# Patient Record
Sex: FEMALE | Race: WHITE | Employment: UNEMPLOYED | URBAN - METROPOLITAN AREA
[De-identification: names, ages, dates, MRNs, and addresses within clinical notes are randomized per-mention and may not be internally consistent; named-entity substitution may affect disease eponyms.]

---

## 2017-03-29 RX ORDER — BUPROPION HYDROCHLORIDE 300 MG/1
300 TABLET ORAL DAILY
Qty: 90 TABLET | Refills: 0 | Status: SHIPPED | OUTPATIENT
Start: 2017-03-29 | End: 2019-01-04 | Stop reason: ALTCHOICE

## 2017-03-29 NOTE — TELEPHONE ENCOUNTER
Approve/ deny Wellbutrin  mg?    #90     Last OV with Kitty See was 09/12/16    Medication last authorized 12/20/16   #90

## 2017-03-29 NOTE — TELEPHONE ENCOUNTER
Pt called pharmacy and they told her to contact PCP because she has no more refills on her wellbutrin.

## 2017-04-18 ENCOUNTER — OFFICE VISIT (OUTPATIENT)
Dept: OBGYN CLINIC | Facility: CLINIC | Age: 42
End: 2017-04-18

## 2017-04-18 VITALS
DIASTOLIC BLOOD PRESSURE: 84 MMHG | HEART RATE: 88 BPM | RESPIRATION RATE: 16 BRPM | WEIGHT: 148 LBS | HEIGHT: 64 IN | BODY MASS INDEX: 25.27 KG/M2 | SYSTOLIC BLOOD PRESSURE: 134 MMHG

## 2017-04-18 DIAGNOSIS — Z01.419 ENCOUNTER FOR WELL WOMAN EXAM WITH ROUTINE GYNECOLOGICAL EXAM: Primary | ICD-10-CM

## 2017-04-18 PROCEDURE — 88175 CYTOPATH C/V AUTO FLUID REDO: CPT | Performed by: OBSTETRICS & GYNECOLOGY

## 2017-04-18 PROCEDURE — 87624 HPV HI-RISK TYP POOLED RSLT: CPT | Performed by: OBSTETRICS & GYNECOLOGY

## 2017-04-18 PROCEDURE — 99396 PREV VISIT EST AGE 40-64: CPT | Performed by: OBSTETRICS & GYNECOLOGY

## 2017-04-18 RX ORDER — FLUOXETINE 20 MG/1
20 TABLET ORAL DAILY
Qty: 30 TABLET | Refills: 6 | Status: SHIPPED | OUTPATIENT
Start: 2017-04-18 | End: 2017-05-18

## 2017-04-18 NOTE — PROGRESS NOTES
Vania Meehan is a 35year old female  Patient's last menstrual period was 2017 (exact date).  Patient presents with:  Wellness Visit: annual  .  Menses light after ablation, c/o feeling emotional before menses    OBSTETRICS HISTORY: BuPROPion HCl ER, XL, (WELLBUTRIN XL) 300 MG Oral Tablet 24 Hr, Take 1 tablet (300 mg total) by mouth daily. , Disp: 90 tablet, Rfl: 0  •  metRONIDAZOLE (METROCREAM) 0.75 % External Cream, Apply 5.94 applicators topically 2 (two) times daily. , Disp: 60 g, R Oriented to time, place, person and situation.  Appropriate mood and affect    Pelvic Exam:  External Genitalia: normal appearance, hair distribution, and no lesions  Urethral Meatus:  normal in size, location, without lesions and prolapse  Bladder:  No ful

## 2017-09-06 ENCOUNTER — TELEPHONE (OUTPATIENT)
Dept: OBGYN CLINIC | Facility: CLINIC | Age: 42
End: 2017-09-06

## 2018-02-09 NOTE — TELEPHONE ENCOUNTER
Pt calling reg medication refill pt states she would like to have 3 month supply of capsules for Sarafem sent to pharm on file

## 2018-02-13 RX ORDER — FLUOXETINE 20 MG/1
20 TABLET ORAL DAILY
Qty: 90 TABLET | Refills: 0 | Status: SHIPPED | OUTPATIENT
Start: 2018-02-13 | End: 2018-03-15

## 2018-11-07 ENCOUNTER — TELEPHONE (OUTPATIENT)
Dept: INTERNAL MEDICINE CLINIC | Facility: CLINIC | Age: 43
End: 2018-11-07

## 2018-11-07 NOTE — TELEPHONE ENCOUNTER
Yes, I am open to spouses/family members of existing patients in my practice. Jose Torres. Diony Wilder MD  Diplomate, American Board of Internal Medicine  [facility]  [address]  [facility],4Th Floor, [address], [facility], [address] [place] [address]  T: [phone]; F: [address]

## 2018-11-07 NOTE — TELEPHONE ENCOUNTER
Patient's  Gali Barajas is a current patient of Dr. Manisha Hudson. Patient would like to establish care with Dr. Manisha Hudson. Please ask Dr. Manisha Hudson if it's ok.

## 2019-01-04 ENCOUNTER — OFFICE VISIT (OUTPATIENT)
Dept: INTERNAL MEDICINE CLINIC | Facility: CLINIC | Age: 44
End: 2019-01-04
Payer: COMMERCIAL

## 2019-01-04 ENCOUNTER — PATIENT MESSAGE (OUTPATIENT)
Dept: INTERNAL MEDICINE CLINIC | Facility: CLINIC | Age: 44
End: 2019-01-04

## 2019-01-04 VITALS
TEMPERATURE: 98 F | HEART RATE: 92 BPM | SYSTOLIC BLOOD PRESSURE: 144 MMHG | HEIGHT: 62.75 IN | WEIGHT: 167 LBS | BODY MASS INDEX: 29.96 KG/M2 | DIASTOLIC BLOOD PRESSURE: 92 MMHG | RESPIRATION RATE: 16 BRPM

## 2019-01-04 DIAGNOSIS — E78.9 LIPID DISORDER: Primary | ICD-10-CM

## 2019-01-04 DIAGNOSIS — Z98.890 HISTORY OF AUGMENTATION OF BOTH BREASTS: ICD-10-CM

## 2019-01-04 DIAGNOSIS — F41.1 GAD (GENERALIZED ANXIETY DISORDER): ICD-10-CM

## 2019-01-04 DIAGNOSIS — Z00.00 ROUTINE GENERAL MEDICAL EXAMINATION AT A HEALTH CARE FACILITY: Primary | ICD-10-CM

## 2019-01-04 DIAGNOSIS — Z12.31 SCREENING MAMMOGRAM, ENCOUNTER FOR: ICD-10-CM

## 2019-01-04 DIAGNOSIS — I10 BENIGN ESSENTIAL HYPERTENSION: ICD-10-CM

## 2019-01-04 DIAGNOSIS — K13.0 LIP LESION: ICD-10-CM

## 2019-01-04 DIAGNOSIS — N94.3 PMS (PREMENSTRUAL SYNDROME): ICD-10-CM

## 2019-01-04 PROCEDURE — 99386 PREV VISIT NEW AGE 40-64: CPT | Performed by: INTERNAL MEDICINE

## 2019-01-04 RX ORDER — FLUOXETINE HYDROCHLORIDE 20 MG/1
CAPSULE ORAL
COMMUNITY
Start: 2018-07-23 | End: 2019-01-04

## 2019-01-04 RX ORDER — LOSARTAN POTASSIUM 50 MG/1
50 TABLET ORAL DAILY
Qty: 90 TABLET | Refills: 3 | Status: SHIPPED | OUTPATIENT
Start: 2019-01-04 | End: 2020-01-09

## 2019-01-04 NOTE — PROGRESS NOTES
Patient presents with:  CPX: not fasting       HPI: Patito Payne is a 38 y/o WF, new patient here to establish PCP and to undergo the female CPX. Review of Systems   Endo/Heme/Allergies:        + Hormonal imbalance.    Psychiatric/Behavioral:        Anxiet grossly intact, no focal deficits; 2+ DTRs  Psych - nl mood/affect      A/P:  Routine general medical examination at a health care facility  (primary encounter diagnosis)  Screening mammogram, encounter for  History of augmentation of both breasts  Pms (pr

## 2019-01-04 NOTE — PATIENT INSTRUCTIONS
Maddison,    1. Get labs done. 2. Get mammogram done. 3. I'll MyChart you more information on therapist, OB/GYN, and much much more. 4. See you back in 6 weeks. 5. Stay consistent in your daily efforts.     Kind regards,  Dr. Grabiel Jolley

## 2019-01-10 NOTE — TELEPHONE ENCOUNTER
From: Chandrakant Medal  Sent: 1/9/2019 5:00 PM CST  To: Em[facility] 08 Clinical Staff  Subject: RE:Contact info and other things    ----- Message from Mychart Generic sent at 1/9/2019 5:00 PM CST -----    Dr. Gwendlyn Jeans,    Thank you so much for all of the inform

## 2019-01-11 ENCOUNTER — LAB ENCOUNTER (OUTPATIENT)
Dept: LAB | Age: 44
End: 2019-01-11
Attending: INTERNAL MEDICINE
Payer: COMMERCIAL

## 2019-01-11 DIAGNOSIS — Z00.00 ROUTINE GENERAL MEDICAL EXAMINATION AT A HEALTH CARE FACILITY: ICD-10-CM

## 2019-01-11 LAB
ALBUMIN SERPL-MCNC: 4.4 G/DL (ref 3.1–4.5)
ALBUMIN/GLOB SERPL: 1.2 {RATIO} (ref 1–2)
ALP LIVER SERPL-CCNC: 40 U/L (ref 37–98)
ALT SERPL-CCNC: 32 U/L (ref 14–54)
ANION GAP SERPL CALC-SCNC: 9 MMOL/L (ref 0–18)
AST SERPL-CCNC: 19 U/L (ref 15–41)
BASOPHILS # BLD AUTO: 0.03 X10(3) UL (ref 0–0.1)
BASOPHILS NFR BLD AUTO: 0.5 %
BILIRUB SERPL-MCNC: 0.8 MG/DL (ref 0.1–2)
BILIRUB UR QL STRIP.AUTO: NEGATIVE
BUN BLD-MCNC: 15 MG/DL (ref 8–20)
BUN/CREAT SERPL: 18.3 (ref 10–20)
CALCIUM BLD-MCNC: 9 MG/DL (ref 8.3–10.3)
CHLORIDE SERPL-SCNC: 103 MMOL/L (ref 101–111)
CHOLEST SMN-MCNC: 195 MG/DL (ref ?–200)
CLARITY UR REFRACT.AUTO: CLEAR
CO2 SERPL-SCNC: 25 MMOL/L (ref 22–32)
COLOR UR AUTO: YELLOW
CREAT BLD-MCNC: 0.82 MG/DL (ref 0.55–1.02)
EOSINOPHIL # BLD AUTO: 0.08 X10(3) UL (ref 0–0.3)
EOSINOPHIL NFR BLD AUTO: 1.3 %
ERYTHROCYTE [DISTWIDTH] IN BLOOD BY AUTOMATED COUNT: 11.7 % (ref 11.5–16)
EST. AVERAGE GLUCOSE BLD GHB EST-MCNC: 105 MG/DL (ref 68–126)
GLOBULIN PLAS-MCNC: 3.6 G/DL (ref 2.8–4.4)
GLUCOSE BLD-MCNC: 99 MG/DL (ref 70–99)
GLUCOSE UR STRIP.AUTO-MCNC: NEGATIVE MG/DL
HBA1C MFR BLD HPLC: 5.3 % (ref ?–5.7)
HCT VFR BLD AUTO: 42.3 % (ref 34–50)
HDLC SERPL-MCNC: 72 MG/DL (ref 40–59)
HGB BLD-MCNC: 13.7 G/DL (ref 12–16)
IMMATURE GRANULOCYTE COUNT: 0.03 X10(3) UL (ref 0–1)
IMMATURE GRANULOCYTE RATIO %: 0.5 %
KETONES UR STRIP.AUTO-MCNC: NEGATIVE MG/DL
LDLC SERPL CALC-MCNC: 84 MG/DL (ref ?–100)
LEUKOCYTE ESTERASE UR QL STRIP.AUTO: NEGATIVE
LYMPHOCYTES # BLD AUTO: 1.83 X10(3) UL (ref 0.9–4)
LYMPHOCYTES NFR BLD AUTO: 28.9 %
M PROTEIN MFR SERPL ELPH: 8 G/DL (ref 6.4–8.2)
MCH RBC QN AUTO: 29.8 PG (ref 27–33.2)
MCHC RBC AUTO-ENTMCNC: 32.4 G/DL (ref 31–37)
MCV RBC AUTO: 92 FL (ref 81–100)
MONOCYTES # BLD AUTO: 0.51 X10(3) UL (ref 0.1–1)
MONOCYTES NFR BLD AUTO: 8 %
NEUTROPHIL ABS PRELIM: 3.86 X10 (3) UL (ref 1.3–6.7)
NEUTROPHILS # BLD AUTO: 3.86 X10(3) UL (ref 1.3–6.7)
NEUTROPHILS NFR BLD AUTO: 60.8 %
NITRITE UR QL STRIP.AUTO: NEGATIVE
NONHDLC SERPL-MCNC: 123 MG/DL (ref ?–130)
OSMOLALITY SERPL CALC.SUM OF ELEC: 285 MOSM/KG (ref 275–295)
PH UR STRIP.AUTO: 7 [PH] (ref 4.5–8)
PLATELET # BLD AUTO: 205 10(3)UL (ref 150–450)
POTASSIUM SERPL-SCNC: 3.9 MMOL/L (ref 3.6–5.1)
PROT UR STRIP.AUTO-MCNC: NEGATIVE MG/DL
RBC # BLD AUTO: 4.6 X10(6)UL (ref 3.8–5.1)
RBC UR QL AUTO: NEGATIVE
RED CELL DISTRIBUTION WIDTH-SD: 39.4 FL (ref 35.1–46.3)
SODIUM SERPL-SCNC: 137 MMOL/L (ref 136–144)
SP GR UR STRIP.AUTO: 1.01 (ref 1–1.03)
TRIGL SERPL-MCNC: 193 MG/DL (ref 30–149)
TSI SER-ACNC: 1.5 MIU/ML (ref 0.35–5.5)
UROBILINOGEN UR STRIP.AUTO-MCNC: <2 MG/DL
VIT D+METAB SERPL-MCNC: 25.6 NG/ML (ref 30–100)
VLDLC SERPL CALC-MCNC: 39 MG/DL (ref 0–30)
WBC # BLD AUTO: 6.3 X10(3) UL (ref 4–13)

## 2019-01-11 PROCEDURE — 83036 HEMOGLOBIN GLYCOSYLATED A1C: CPT

## 2019-01-11 PROCEDURE — 82306 VITAMIN D 25 HYDROXY: CPT

## 2019-01-11 PROCEDURE — 84443 ASSAY THYROID STIM HORMONE: CPT

## 2019-01-11 PROCEDURE — 80061 LIPID PANEL: CPT

## 2019-01-11 PROCEDURE — 36415 COLL VENOUS BLD VENIPUNCTURE: CPT

## 2019-01-11 PROCEDURE — 85025 COMPLETE CBC W/AUTO DIFF WBC: CPT

## 2019-01-11 PROCEDURE — 80053 COMPREHEN METABOLIC PANEL: CPT

## 2019-01-11 PROCEDURE — 81003 URINALYSIS AUTO W/O SCOPE: CPT

## 2019-01-11 NOTE — PROGRESS NOTES
Referral to Dr. Mandy Quinonez in [place] for Dermatology placed, notify pt. Jyl Aid. Sosa Dias MD  Diplomate, American Board of Internal Medicine  [facility]  [address]  [facility],4Th Floor, [address], [place] [address], [address] [place] [address]  T: [phone]; F: [address]

## 2019-01-18 ENCOUNTER — HOSPITAL ENCOUNTER (OUTPATIENT)
Dept: MAMMOGRAPHY | Age: 44
Discharge: HOME | End: 2019-01-18
Attending: INTERNAL MEDICINE
Payer: COMMERCIAL

## 2019-01-18 DIAGNOSIS — Z98.890 HISTORY OF AUGMENTATION OF BOTH BREASTS: ICD-10-CM

## 2019-01-18 DIAGNOSIS — Z12.31 SCREENING MAMMOGRAM, ENCOUNTER FOR: ICD-10-CM

## 2019-01-18 PROCEDURE — 77067 SCR MAMMO BI INCL CAD: CPT | Performed by: INTERNAL MEDICINE

## 2019-01-18 PROCEDURE — 77063 BREAST TOMOSYNTHESIS BI: CPT | Performed by: INTERNAL MEDICINE

## 2019-01-22 DIAGNOSIS — K13.0 LIP LESION: Primary | ICD-10-CM

## 2019-02-22 ENCOUNTER — TELEPHONE (OUTPATIENT)
Dept: INTERNAL MEDICINE CLINIC | Facility: CLINIC | Age: 44
End: 2019-02-22

## 2019-02-22 ENCOUNTER — OFFICE VISIT (OUTPATIENT)
Dept: INTERNAL MEDICINE CLINIC | Facility: CLINIC | Age: 44
End: 2019-02-22
Payer: COMMERCIAL

## 2019-02-22 VITALS
DIASTOLIC BLOOD PRESSURE: 72 MMHG | WEIGHT: 165.25 LBS | HEIGHT: 62.75 IN | HEART RATE: 88 BPM | TEMPERATURE: 98 F | RESPIRATION RATE: 16 BRPM | BODY MASS INDEX: 29.65 KG/M2 | SYSTOLIC BLOOD PRESSURE: 132 MMHG

## 2019-02-22 DIAGNOSIS — I10 BENIGN ESSENTIAL HYPERTENSION: Primary | ICD-10-CM

## 2019-02-22 DIAGNOSIS — E66.3 OVERWEIGHT (BMI 25.0-29.9): ICD-10-CM

## 2019-02-22 PROCEDURE — 99213 OFFICE O/P EST LOW 20 MIN: CPT | Performed by: INTERNAL MEDICINE

## 2019-02-22 RX ORDER — ASCORBIC ACID 500 MG
TABLET ORAL
COMMUNITY

## 2019-02-22 NOTE — PROGRESS NOTES
Patient presents with:  Medication Follow-Up      HPI: Vicky Sterling presents today for 6 week f/u HTN. After her last visit, I started her on Losartan 50 mg q AM.  She's tolerated this medication well and home BPs are controlled.   She's interested in weight l Manuela Cornejo started today via samples. She self-administered without event. Continue protocol. 3. RTC 3 months for HTN + Overweight status f/u. Sho Stanley verbally agrees to and understands the plan as outlined above.   She was also afforded the time and oppo

## 2019-02-22 NOTE — TELEPHONE ENCOUNTER
saxenda 3 MG pre-authorization and wants it appealed  Yaz Huggins [address], [address] [facility]  [address] [facility] [address], [phone], [phone]

## 2019-02-25 RX ORDER — LIRAGLUTIDE 6 MG/ML
3 INJECTION, SOLUTION SUBCUTANEOUS DAILY
Qty: 3 ML | Refills: 5 | Status: SHIPPED | OUTPATIENT
Start: 2019-02-25 | End: 2020-01-09

## 2019-02-25 NOTE — TELEPHONE ENCOUNTER
Rx has not been sent to pharmacy. Well start off by sending the prescription so if a PA is needed the pharmacy can let our office know and go from there. Per last OV note Saxenda sample was given and started on 2/22/19.  Can we send an rx to the pharmac

## 2019-02-26 NOTE — TELEPHONE ENCOUNTER
Noam lauren (Thomas: Kya Alex)   Rx #: [id number]   Saxenda 18MG/3ML SC SOPN   Waiting for determination.

## 2019-02-26 NOTE — TELEPHONE ENCOUNTER
Kary Dhillon MD 2 hours ago (8:37 AM)         In regard to the medication Saxenda that was just called in to [facility]. I received a phone call from the pharmacy  saying that my insurance has denied the medication and that they are r

## 2019-02-27 NOTE — TELEPHONE ENCOUNTER
Request Reference Number: [id number].  Barabara Lundborg 18MG/3ML is denied for not meeting the prior authorization requirement(s)    Letter received from [facility] stating that the medication is a plan exclusion - patient needs to contact insurance for plan bene

## 2019-03-04 ENCOUNTER — TELEPHONE (OUTPATIENT)
Dept: INTERNAL MEDICINE CLINIC | Facility: CLINIC | Age: 44
End: 2019-03-04

## 2019-03-12 ENCOUNTER — TELEPHONE (OUTPATIENT)
Dept: INTERNAL MEDICINE CLINIC | Facility: CLINIC | Age: 44
End: 2019-03-12

## 2019-03-12 NOTE — TELEPHONE ENCOUNTER
SAXENDA 18 MG/3ML Subcutaneous Solution Pen-injector  Cover my meds would like a call back its for an Prior Authorization

## 2019-03-12 NOTE — TELEPHONE ENCOUNTER
See TE from 3/4/2019. Spoke to Cover my meds and requesting we contact [phone] to further complete appeal.    Spoke to Bear Fernandes at [facility] and she is able to view a pharmacy pending appeal that would be determined on or before 3/26/2019.     Ca

## 2019-10-28 ENCOUNTER — TELEPHONE (OUTPATIENT)
Dept: INTERNAL MEDICINE CLINIC | Facility: CLINIC | Age: 44
End: 2019-10-28

## 2019-10-28 NOTE — TELEPHONE ENCOUNTER
Patient called in requesting possible medication change. Patient stated that she is currently taking Losartan Potassium 50 MG Oral Tab. Patient would like to know if she can take Losartan HCTZ instead.  Patient stated that she would like to take this new

## 2019-10-28 NOTE — TELEPHONE ENCOUNTER
Needs OV, 15 min slot to discuss more. Rula Doe. Chayito Vera MD  Diplomate, American Board of Internal Medicine  Member, [employer] [address] [employer]  [address], [address], [facility], [address]  T: [phone]; F: 441.

## 2019-10-29 NOTE — TELEPHONE ENCOUNTER
Trev Nunez MD  Diplomate, American Board of Internal Medicine  Member, [employer] of [address] [facility]  [address]  [facility],4Th Floor, [address], [facility], [address] [place] [address]  T: [phone]; F: [address]

## 2019-10-29 NOTE — TELEPHONE ENCOUNTER
Patient called office. Relayed 's message below. Pt stated her insurance only covers one Doctor's visit per year. Pt has an appt scheduled in January and said she will hold off until then. Pt seeking the combo for the prescription.  She also sta

## 2020-01-09 ENCOUNTER — OFFICE VISIT (OUTPATIENT)
Dept: INTERNAL MEDICINE CLINIC | Facility: CLINIC | Age: 45
End: 2020-01-09
Payer: COMMERCIAL

## 2020-01-09 VITALS
HEART RATE: 102 BPM | TEMPERATURE: 99 F | SYSTOLIC BLOOD PRESSURE: 126 MMHG | DIASTOLIC BLOOD PRESSURE: 76 MMHG | BODY MASS INDEX: 27.29 KG/M2 | WEIGHT: 154 LBS | HEIGHT: 63 IN

## 2020-01-09 DIAGNOSIS — I10 BENIGN ESSENTIAL HYPERTENSION: ICD-10-CM

## 2020-01-09 DIAGNOSIS — F90.2 ATTENTION DEFICIT HYPERACTIVITY DISORDER (ADHD), COMBINED TYPE: ICD-10-CM

## 2020-01-09 DIAGNOSIS — Z00.00 ROUTINE GENERAL MEDICAL EXAMINATION AT A HEALTH CARE FACILITY: Primary | ICD-10-CM

## 2020-01-09 PROBLEM — E66.3 OVERWEIGHT (BMI 25.0-29.9): Status: RESOLVED | Noted: 2019-02-22 | Resolved: 2020-01-09

## 2020-01-09 PROCEDURE — 99396 PREV VISIT EST AGE 40-64: CPT | Performed by: INTERNAL MEDICINE

## 2020-01-09 RX ORDER — DEXTROAMPHETAMINE SACCHARATE, AMPHETAMINE ASPARTATE MONOHYDRATE, DEXTROAMPHETAMINE SULFATE AND AMPHETAMINE SULFATE 2.5; 2.5; 2.5; 2.5 MG/1; MG/1; MG/1; MG/1
10 CAPSULE, EXTENDED RELEASE ORAL DAILY
Qty: 30 CAPSULE | Refills: 0 | Status: SHIPPED | OUTPATIENT
Start: 2020-01-09 | End: 2020-02-08

## 2020-01-09 RX ORDER — DEXTROAMPHETAMINE SACCHARATE, AMPHETAMINE ASPARTATE MONOHYDRATE, DEXTROAMPHETAMINE SULFATE AND AMPHETAMINE SULFATE 2.5; 2.5; 2.5; 2.5 MG/1; MG/1; MG/1; MG/1
10 CAPSULE, EXTENDED RELEASE ORAL DAILY
Qty: 30 CAPSULE | Refills: 0 | Status: SHIPPED | OUTPATIENT
Start: 2020-03-11 | End: 2020-03-03

## 2020-01-09 RX ORDER — DEXTROAMPHETAMINE SACCHARATE, AMPHETAMINE ASPARTATE MONOHYDRATE, DEXTROAMPHETAMINE SULFATE AND AMPHETAMINE SULFATE 2.5; 2.5; 2.5; 2.5 MG/1; MG/1; MG/1; MG/1
10 CAPSULE, EXTENDED RELEASE ORAL DAILY
Qty: 30 CAPSULE | Refills: 0 | Status: SHIPPED | OUTPATIENT
Start: 2020-02-09 | End: 2020-03-03

## 2020-01-09 RX ORDER — LOSARTAN POTASSIUM 50 MG/1
50 TABLET ORAL DAILY
Qty: 90 TABLET | Refills: 0 | Status: SHIPPED | OUTPATIENT
Start: 2020-01-09 | End: 2020-05-27

## 2020-01-09 NOTE — PROGRESS NOTES
Patient presents with: Well Adult: not fasitng       HPI: Vicky Sterling presents today for female CPX. Stable health. Due for wellness labs. Health goals continue to center around longevity, vitality, and QOL.     Review of Systems   Psychiatric/Behavioral: kg)  02/22/19 : 165 lb 4 oz (75 kg)  01/04/19 : 167 lb (75.8 kg)  04/18/17 : 148 lb (67.1 kg)  09/12/16 : 150 lb (68 kg)  01/29/16 : 151 lb (68.5 kg)  Gen - A&Ox3, NAD  HEENT - PERRL, EOMI, OP is clear; TMs clear B  Neck - supple, no JVD, no thyromegaly  L 30 capsule 0     Sig: Take 1 capsule (10 mg total) by mouth daily. • Amphetamine-Dextroamphet ER (ADDERALL XR) 10 MG Oral Capsule SR 24 Hr 30 capsule 0     Sig: Take 1 capsule (10 mg total) by mouth daily.    • Amphetamine-Dextroamphet ER (ADDERALL XR) 10

## 2020-01-09 NOTE — PATIENT INSTRUCTIONS
Bacopa monnieri. Standard Process. Get Heart Scan done.  $75 special.    Do a drug holiday on the Losartan for the next 2 weeks. Check blood pressure on a daily basis. Get labs done.     I'm fine with an Adderall script and I would propose the 10 m

## 2020-01-11 ENCOUNTER — PATIENT MESSAGE (OUTPATIENT)
Dept: INTERNAL MEDICINE CLINIC | Facility: CLINIC | Age: 45
End: 2020-01-11

## 2020-01-11 DIAGNOSIS — B82.9 PARASITES IN STOOL: Primary | ICD-10-CM

## 2020-01-17 NOTE — TELEPHONE ENCOUNTER
From: Usama Garcia  Sent: 1/16/2020 6:42 PM CST  To: [facility] 08 Clinical Staff  Subject: RE: Non-Urgent Medical Question    Thank you Dr. Bronson Duckworth!! I tried searching for the podcast and couldn’t find it under you or any podcast on Neurofeedback for marianela

## 2020-01-21 ENCOUNTER — HOSPITAL ENCOUNTER (OUTPATIENT)
Dept: MAMMOGRAPHY | Age: 45
Discharge: HOME | End: 2020-01-21
Attending: INTERNAL MEDICINE
Payer: COMMERCIAL

## 2020-01-21 ENCOUNTER — APPOINTMENT (OUTPATIENT)
Dept: LAB | Age: 45
End: 2020-01-21
Attending: INTERNAL MEDICINE
Payer: COMMERCIAL

## 2020-01-21 DIAGNOSIS — Z12.31 ENCOUNTER FOR SCREENING MAMMOGRAM FOR MALIGNANT NEOPLASM OF BREAST: ICD-10-CM

## 2020-01-21 LAB
ALBUMIN SERPL-MCNC: 4 G/DL (ref 3.4–5)
ALBUMIN/GLOB SERPL: 1.1 {RATIO} (ref 1–2)
ALP LIVER SERPL-CCNC: 34 U/L (ref 39–100)
ALT SERPL-CCNC: 32 U/L (ref 13–56)
ANION GAP SERPL CALC-SCNC: 6 MMOL/L (ref 0–18)
AST SERPL-CCNC: 20 U/L (ref 15–37)
BASOPHILS # BLD AUTO: 0.03 X10(3) UL (ref 0–0.2)
BASOPHILS NFR BLD AUTO: 0.5 %
BILIRUB SERPL-MCNC: 0.8 MG/DL (ref 0.1–2)
BILIRUB UR QL STRIP.AUTO: NEGATIVE
BUN BLD-MCNC: 16 MG/DL (ref 7–18)
BUN/CREAT SERPL: 19.3 (ref 10–20)
CALCIUM BLD-MCNC: 8.9 MG/DL (ref 8.5–10.1)
CHLORIDE SERPL-SCNC: 105 MMOL/L (ref 98–112)
CHOLEST SMN-MCNC: 164 MG/DL (ref ?–200)
CO2 SERPL-SCNC: 26 MMOL/L (ref 21–32)
COLOR UR AUTO: YELLOW
CREAT BLD-MCNC: 0.83 MG/DL (ref 0.55–1.02)
DEPRECATED RDW RBC AUTO: 41.6 FL (ref 35.1–46.3)
EOSINOPHIL # BLD AUTO: 0.08 X10(3) UL (ref 0–0.7)
EOSINOPHIL NFR BLD AUTO: 1.3 %
ERYTHROCYTE [DISTWIDTH] IN BLOOD BY AUTOMATED COUNT: 12.1 % (ref 11–15)
EST. AVERAGE GLUCOSE BLD GHB EST-MCNC: 94 MG/DL (ref 68–126)
GLOBULIN PLAS-MCNC: 3.6 G/DL (ref 2.8–4.4)
GLUCOSE BLD-MCNC: 95 MG/DL (ref 70–99)
GLUCOSE UR STRIP.AUTO-MCNC: NEGATIVE MG/DL
HBA1C MFR BLD HPLC: 4.9 % (ref ?–5.7)
HCT VFR BLD AUTO: 40.9 % (ref 35–48)
HDLC SERPL-MCNC: 79 MG/DL (ref 40–59)
HGB BLD-MCNC: 13.5 G/DL (ref 12–16)
IMM GRANULOCYTES # BLD AUTO: 0.01 X10(3) UL (ref 0–1)
IMM GRANULOCYTES NFR BLD: 0.2 %
KETONES UR STRIP.AUTO-MCNC: NEGATIVE MG/DL
LDLC SERPL CALC-MCNC: 58 MG/DL (ref ?–100)
LEUKOCYTE ESTERASE UR QL STRIP.AUTO: NEGATIVE
LYMPHOCYTES # BLD AUTO: 2.12 X10(3) UL (ref 1–4)
LYMPHOCYTES NFR BLD AUTO: 35.5 %
M PROTEIN MFR SERPL ELPH: 7.6 G/DL (ref 6.4–8.2)
MCH RBC QN AUTO: 30.5 PG (ref 26–34)
MCHC RBC AUTO-ENTMCNC: 33 G/DL (ref 31–37)
MCV RBC AUTO: 92.3 FL (ref 80–100)
MONOCYTES # BLD AUTO: 0.57 X10(3) UL (ref 0.1–1)
MONOCYTES NFR BLD AUTO: 9.5 %
NEUTROPHILS # BLD AUTO: 3.16 X10 (3) UL (ref 1.5–7.7)
NEUTROPHILS # BLD AUTO: 3.16 X10(3) UL (ref 1.5–7.7)
NEUTROPHILS NFR BLD AUTO: 53 %
NITRITE UR QL STRIP.AUTO: NEGATIVE
NONHDLC SERPL-MCNC: 85 MG/DL (ref ?–130)
OSMOLALITY SERPL CALC.SUM OF ELEC: 285 MOSM/KG (ref 275–295)
PATIENT FASTING Y/N/NP: YES
PATIENT FASTING Y/N/NP: YES
PH UR STRIP.AUTO: 7 [PH] (ref 4.5–8)
PLATELET # BLD AUTO: 230 10(3)UL (ref 150–450)
POTASSIUM SERPL-SCNC: 4 MMOL/L (ref 3.5–5.1)
PROT UR STRIP.AUTO-MCNC: 30 MG/DL
RBC # BLD AUTO: 4.43 X10(6)UL (ref 3.8–5.3)
RBC UR QL AUTO: NEGATIVE
SODIUM SERPL-SCNC: 137 MMOL/L (ref 136–145)
SP GR UR STRIP.AUTO: 1.02 (ref 1–1.03)
TRIGL SERPL-MCNC: 137 MG/DL (ref 30–149)
TSI SER-ACNC: 1.37 MIU/ML (ref 0.36–3.74)
UROBILINOGEN UR STRIP.AUTO-MCNC: <2 MG/DL
VIT D+METAB SERPL-MCNC: 21.3 NG/ML (ref 30–100)
VLDLC SERPL CALC-MCNC: 27 MG/DL (ref 0–30)
WBC # BLD AUTO: 6 X10(3) UL (ref 4–11)

## 2020-01-21 PROCEDURE — 77067 SCR MAMMO BI INCL CAD: CPT | Performed by: INTERNAL MEDICINE

## 2020-01-21 PROCEDURE — 77063 BREAST TOMOSYNTHESIS BI: CPT | Performed by: INTERNAL MEDICINE

## 2020-01-22 ENCOUNTER — TELEPHONE (OUTPATIENT)
Dept: INTERNAL MEDICINE CLINIC | Facility: CLINIC | Age: 45
End: 2020-01-22

## 2020-01-22 NOTE — TELEPHONE ENCOUNTER
Patient calling in asking to speak with the Nurse to discuss her lab results. Pt stated there were two results that did not post on her my chart.

## 2020-01-27 NOTE — TELEPHONE ENCOUNTER
[employer]ee message was sent on 1/23/20. I can't tell if she read it yet. Alvarez Cutler. Bernardo Pinzon MD  Diplomate, American Board of Internal Medicine  Member, [employer] [address]  [address]  [address], [address], [facility], Ally Coates

## 2020-02-07 ENCOUNTER — TELEPHONE (OUTPATIENT)
Dept: INTERNAL MEDICINE CLINIC | Facility: CLINIC | Age: 45
End: 2020-02-07

## 2020-02-07 NOTE — TELEPHONE ENCOUNTER
Day[facility] [address] calling in, stated the patient is seeking an early release RX Amphetamine-Dextroamphet ER (ADDERALL XR) 10 MG Oral Capsule SR 24 Hr. Pharmacy stated pt is waiting at the Pharmacy now. Please call [employer] with early release status.

## 2020-02-13 NOTE — TELEPHONE ENCOUNTER
1. Stool for ova and parasites ordered. 2. Clarify the Adderall request.  She is on the Adderall ER 10 mg once daily. Is she inquiring about adding a 2nd 10 mg on the back half of the day? Yomi Miller.  Juan Carlos Stringer MD  Diplomate, [address] [place] [address] Board of Internal Medic

## 2020-02-13 NOTE — TELEPHONE ENCOUNTER
Please advise on OV for parasite? See message from Emily/patient below. Pt is calling again. Pt would like an answer today.

## 2020-02-18 NOTE — TELEPHONE ENCOUNTER
I have no problem doing the Adderall ER 10 mg BID dosing. Please clarify when she last picked up the previous Adderall script from her pharmacy as we wouldn't be able to start this medication until it's time for her next refill. Sharif Steel.  Godwin Rogers, [address]

## 2020-02-27 RX ORDER — DEXTROAMPHETAMINE SACCHARATE, AMPHETAMINE ASPARTATE MONOHYDRATE, DEXTROAMPHETAMINE SULFATE AND AMPHETAMINE SULFATE 2.5; 2.5; 2.5; 2.5 MG/1; MG/1; MG/1; MG/1
10 CAPSULE, EXTENDED RELEASE ORAL 2 TIMES DAILY
Qty: 60 CAPSULE | Refills: 0 | Status: SHIPPED | OUTPATIENT
Start: 2020-03-11 | End: 2020-03-07

## 2020-02-27 NOTE — TELEPHONE ENCOUNTER
Per pt's [email or web address] message she picked up the last prescription on 2/11/2020. Please advise as pt is calling back regarding message.  TY

## 2020-02-27 NOTE — TELEPHONE ENCOUNTER
Noted.  Script printed. She won't be able to fill the BID dosing until 3/11/20 since her last Adderall script was filled on 2/11/20. Sharif Steel.  Godwin Rogers MD  Diplomate, [address] [place] [address] Board of Internal Medicine  Member, [address] [place] [address]

## 2020-03-06 ENCOUNTER — TELEPHONE (OUTPATIENT)
Dept: INTERNAL MEDICINE CLINIC | Facility: CLINIC | Age: 45
End: 2020-03-06

## 2020-03-06 NOTE — TELEPHONE ENCOUNTER
See [employer] message dated 1/11/20. Pt was informed per Dr Bronson Duckworth to [facility] until 3/11/20\" to refill new dose of adderall.

## 2020-03-06 NOTE — TELEPHONE ENCOUNTER
Patient called requesting to speak with the nurse. States that she has been having an ongoing issue filling her adderall. Patient's dosage was recently changed and is unable to fill prescription until 3/11.  Patient states that she will not have enough over

## 2020-03-07 RX ORDER — DEXTROAMPHETAMINE SACCHARATE, AMPHETAMINE ASPARTATE MONOHYDRATE, DEXTROAMPHETAMINE SULFATE AND AMPHETAMINE SULFATE 2.5; 2.5; 2.5; 2.5 MG/1; MG/1; MG/1; MG/1
10 CAPSULE, EXTENDED RELEASE ORAL 2 TIMES DAILY
Qty: 60 CAPSULE | Refills: 0 | Status: SHIPPED | OUTPATIENT
Start: 2020-03-11 | End: 2020-03-09

## 2020-03-07 NOTE — TELEPHONE ENCOUNTER
Script just electronically sent by me this AM at 9:37 to [address] Nunez MD  Diplomate, American Board of Internal Medicine  Member, [employer] College of [facility]  [address]  [address] [place] [address],4Th Floor, [address], [facility], [address] [place] [address]

## 2020-03-09 RX ORDER — DEXTROAMPHETAMINE SACCHARATE, AMPHETAMINE ASPARTATE MONOHYDRATE, DEXTROAMPHETAMINE SULFATE AND AMPHETAMINE SULFATE 2.5; 2.5; 2.5; 2.5 MG/1; MG/1; MG/1; MG/1
10 CAPSULE, EXTENDED RELEASE ORAL 2 TIMES DAILY
Qty: 60 CAPSULE | Refills: 0 | Status: SHIPPED | OUTPATIENT
Start: 2020-03-09 | End: 2020-08-22

## 2020-03-09 NOTE — TELEPHONE ENCOUNTER
The new script has same start date to fill of 3/11/20. Did you mean to authorize an early fill? Or have pt wait until 3/11/20. Please clarify.

## 2020-03-09 NOTE — TELEPHONE ENCOUNTER
Script sent. Wale Evans. Bekah Miller MD  Diplomate, American Board of Internal Medicine  Member, [employer] College of [facility]  [address]  [facility],4Th Floor, [address], [address]  T: [phone]; F: [address]

## 2020-03-09 NOTE — TELEPHONE ENCOUNTER
OK to start now. Glenn Harrell. Shiv Nunez MD  Diplomate, American Board of Internal Medicine  Member, [employer] of [address]  [address]  [address],4Th Floor, [address], [facility], [address] [place] [address]  T: [phone]; F: [address]

## 2020-05-06 RX ORDER — DEXTROAMPHETAMINE SACCHARATE, AMPHETAMINE ASPARTATE MONOHYDRATE, DEXTROAMPHETAMINE SULFATE AND AMPHETAMINE SULFATE 2.5; 2.5; 2.5; 2.5 MG/1; MG/1; MG/1; MG/1
10 CAPSULE, EXTENDED RELEASE ORAL 2 TIMES DAILY
Qty: 60 CAPSULE | Refills: 0 | Status: CANCELLED | OUTPATIENT
Start: 2020-05-06

## 2020-05-06 NOTE — TELEPHONE ENCOUNTER
Amphetamine-Dextroamphet ER 10 MG Oral Capsule SR 24 Hr  No protocol     Last OV relevant to medication: 1/9/2020  Last refill date: 3/9/2020     #/refills: #60, 0 refills   When pt was asked to return for OV: 6 months for ADHD f/u   Upcoming appt/reason:

## 2020-05-06 NOTE — TELEPHONE ENCOUNTER
Amphetamine-Dextroamphet ER 10 MG   [facility] # [address], [phone]

## 2020-05-07 RX ORDER — DEXTROAMPHETAMINE SACCHARATE, AMPHETAMINE ASPARTATE MONOHYDRATE, DEXTROAMPHETAMINE SULFATE AND AMPHETAMINE SULFATE 2.5; 2.5; 2.5; 2.5 MG/1; MG/1; MG/1; MG/1
10 CAPSULE, EXTENDED RELEASE ORAL 2 TIMES DAILY
Qty: 60 CAPSULE | Refills: 0 | Status: SHIPPED | OUTPATIENT
Start: 2020-05-07 | End: 2020-06-06

## 2020-05-07 RX ORDER — DEXTROAMPHETAMINE SACCHARATE, AMPHETAMINE ASPARTATE MONOHYDRATE, DEXTROAMPHETAMINE SULFATE AND AMPHETAMINE SULFATE 2.5; 2.5; 2.5; 2.5 MG/1; MG/1; MG/1; MG/1
10 CAPSULE, EXTENDED RELEASE ORAL 2 TIMES DAILY
Qty: 60 CAPSULE | Refills: 0 | Status: SHIPPED | OUTPATIENT
Start: 2020-06-07 | End: 2020-07-07

## 2020-05-07 RX ORDER — DEXTROAMPHETAMINE SACCHARATE, AMPHETAMINE ASPARTATE MONOHYDRATE, DEXTROAMPHETAMINE SULFATE AND AMPHETAMINE SULFATE 2.5; 2.5; 2.5; 2.5 MG/1; MG/1; MG/1; MG/1
10 CAPSULE, EXTENDED RELEASE ORAL 2 TIMES DAILY
Qty: 60 CAPSULE | Refills: 0 | Status: SHIPPED | OUTPATIENT
Start: 2020-07-08 | End: 2020-08-07

## 2020-05-26 DIAGNOSIS — I10 BENIGN ESSENTIAL HYPERTENSION: ICD-10-CM

## 2020-05-27 DIAGNOSIS — I10 BENIGN ESSENTIAL HYPERTENSION: ICD-10-CM

## 2020-05-27 RX ORDER — LOSARTAN POTASSIUM 50 MG/1
TABLET ORAL
Qty: 30 TABLET | Refills: 1 | Status: SHIPPED | OUTPATIENT
Start: 2020-05-27 | End: 2020-07-02

## 2020-05-29 RX ORDER — LOSARTAN POTASSIUM 50 MG/1
TABLET ORAL
Qty: 90 TABLET | Refills: 0 | Status: SHIPPED | OUTPATIENT
Start: 2020-05-29 | End: 2020-07-02

## 2020-05-29 NOTE — TELEPHONE ENCOUNTER
Losartan 50 mg 1 tab daily filled 5-27-20 30 with 1 refill     Pt is requesting a 90 day supply     LOV 1-9-20   . RTC 6 months for ADHD f/u.   Next apt 7-9-20   Labs 1-21-20   HTN - Stable on prescription medication, but she'd like to do a drug holiday to

## 2020-06-15 ENCOUNTER — TELEPHONE (OUTPATIENT)
Dept: INTERNAL MEDICINE CLINIC | Facility: CLINIC | Age: 45
End: 2020-06-15

## 2020-06-15 NOTE — TELEPHONE ENCOUNTER
[facility] called and stated that they need a prior authorization on the prescription for Amphetamine-Dextroamphet ER (ADDERALL XR) 10 MG Oral Capsule SR 24 Hr. Please advise.

## 2020-06-16 NOTE — TELEPHONE ENCOUNTER
Key: [id number]     Your information has been submitted to [email or web address]. [employer] is reviewing the PA request and you will receive an electronic response.  You may check for the updated outcome later by reopening this request. The standard fax determinatio

## 2020-07-02 ENCOUNTER — TELEMEDICINE (OUTPATIENT)
Dept: INTERNAL MEDICINE CLINIC | Facility: CLINIC | Age: 45
End: 2020-07-02
Payer: COMMERCIAL

## 2020-07-02 VITALS — SYSTOLIC BLOOD PRESSURE: 125 MMHG | DIASTOLIC BLOOD PRESSURE: 83 MMHG

## 2020-07-02 DIAGNOSIS — I10 BENIGN ESSENTIAL HYPERTENSION: Primary | ICD-10-CM

## 2020-07-02 DIAGNOSIS — A09 TRAVELER'S DIARRHEA: ICD-10-CM

## 2020-07-02 DIAGNOSIS — F90.2 ATTENTION DEFICIT HYPERACTIVITY DISORDER (ADHD), COMBINED TYPE: ICD-10-CM

## 2020-07-02 PROCEDURE — 99214 OFFICE O/P EST MOD 30 MIN: CPT | Performed by: INTERNAL MEDICINE

## 2020-07-02 RX ORDER — CIPROFLOXACIN 500 MG/1
TABLET, FILM COATED ORAL
Qty: 10 TABLET | Refills: 0 | Status: SHIPPED | OUTPATIENT
Start: 2020-07-02 | End: 2021-01-13 | Stop reason: ALTCHOICE

## 2020-07-02 RX ORDER — LOSARTAN POTASSIUM 50 MG/1
50 TABLET ORAL DAILY
Qty: 90 TABLET | Refills: 1 | Status: SHIPPED | OUTPATIENT
Start: 2020-07-02 | End: 2021-01-13

## 2020-07-02 NOTE — PROGRESS NOTES
This is a telemedicine visit with live, interactive video and audio. Patient understands and accepts financial responsibility for any deductible, co-insurance and/or co-pays associated with this service. Patient presents with:   Follow - Up: 3-months Current Outpatient Medications   Medication Sig Dispense Refill   • LOSARTAN POTASSIUM 50 MG Oral Tab TAKE ONE TABLET BY MOUTH ONE TIME DAILY  90 tablet 0   • [START ON 7/8/2020] Amphetamine-Dextroamphet ER (ADDERALL XR) 10 MG Oral Capsule SR 24 Hr Take 18536  T: [phone]; F: [phone]

## 2020-08-22 RX ORDER — DEXTROAMPHETAMINE SACCHARATE, AMPHETAMINE ASPARTATE MONOHYDRATE, DEXTROAMPHETAMINE SULFATE AND AMPHETAMINE SULFATE 2.5; 2.5; 2.5; 2.5 MG/1; MG/1; MG/1; MG/1
CAPSULE, EXTENDED RELEASE ORAL
Qty: 60 CAPSULE | Refills: 0 | Status: SHIPPED | OUTPATIENT
Start: 2020-08-22 | End: 2020-08-30

## 2020-08-22 NOTE — TELEPHONE ENCOUNTER
adderall er 10 mg 1 cap bid filled 3-9-20     LOV 7-2-20   RTC 6 months for annual CPX  No upcoming apt on file   Labs 1-21-20

## 2020-08-28 ENCOUNTER — PATIENT MESSAGE (OUTPATIENT)
Dept: INTERNAL MEDICINE CLINIC | Facility: CLINIC | Age: 45
End: 2020-08-28

## 2020-08-30 RX ORDER — DEXTROAMPHETAMINE SACCHARATE, AMPHETAMINE ASPARTATE MONOHYDRATE, DEXTROAMPHETAMINE SULFATE AND AMPHETAMINE SULFATE 2.5; 2.5; 2.5; 2.5 MG/1; MG/1; MG/1; MG/1
10 CAPSULE, EXTENDED RELEASE ORAL 2 TIMES DAILY
Qty: 60 CAPSULE | Refills: 0 | Status: SHIPPED | OUTPATIENT
Start: 2020-08-30 | End: 2021-01-13

## 2020-08-30 NOTE — TELEPHONE ENCOUNTER
From: Aki Lopez  To:  Damon Lewis MD  Sent: 8/28/2020 4:06 PM CDT  Subject: Prescription Question    Hello,    Will you please transfer my Adderal prescription from [address] [place] [address] in [place] to [facility] at [facility] as the wood i

## 2020-10-01 NOTE — TELEPHONE ENCOUNTER
Patient calling for refill and is asking for additional refills if possible?  Please call when approved to let her know

## 2020-10-02 RX ORDER — DEXTROAMPHETAMINE SACCHARATE, AMPHETAMINE ASPARTATE MONOHYDRATE, DEXTROAMPHETAMINE SULFATE AND AMPHETAMINE SULFATE 2.5; 2.5; 2.5; 2.5 MG/1; MG/1; MG/1; MG/1
10 CAPSULE, EXTENDED RELEASE ORAL 2 TIMES DAILY
Qty: 60 CAPSULE | Refills: 0 | Status: SHIPPED | OUTPATIENT
Start: 2020-10-02 | End: 2020-11-01

## 2020-10-02 RX ORDER — DEXTROAMPHETAMINE SACCHARATE, AMPHETAMINE ASPARTATE MONOHYDRATE, DEXTROAMPHETAMINE SULFATE AND AMPHETAMINE SULFATE 2.5; 2.5; 2.5; 2.5 MG/1; MG/1; MG/1; MG/1
10 CAPSULE, EXTENDED RELEASE ORAL 2 TIMES DAILY
Qty: 60 CAPSULE | Refills: 0 | Status: SHIPPED | OUTPATIENT
Start: 2020-12-03 | End: 2021-01-02

## 2020-10-02 RX ORDER — DEXTROAMPHETAMINE SACCHARATE, AMPHETAMINE ASPARTATE MONOHYDRATE, DEXTROAMPHETAMINE SULFATE AND AMPHETAMINE SULFATE 2.5; 2.5; 2.5; 2.5 MG/1; MG/1; MG/1; MG/1
10 CAPSULE, EXTENDED RELEASE ORAL 2 TIMES DAILY
Qty: 60 CAPSULE | Refills: 0 | OUTPATIENT
Start: 2020-10-02

## 2020-10-02 RX ORDER — DEXTROAMPHETAMINE SACCHARATE, AMPHETAMINE ASPARTATE MONOHYDRATE, DEXTROAMPHETAMINE SULFATE AND AMPHETAMINE SULFATE 2.5; 2.5; 2.5; 2.5 MG/1; MG/1; MG/1; MG/1
10 CAPSULE, EXTENDED RELEASE ORAL 2 TIMES DAILY
Qty: 60 CAPSULE | Refills: 0 | Status: SHIPPED | OUTPATIENT
Start: 2020-11-02 | End: 2020-12-02

## 2020-10-02 NOTE — TELEPHONE ENCOUNTER
Patient called to follow up on prescription refill. Notified patient that Dr. Shiv Nunez is out of the office.     Patient states that she is completely out

## 2020-10-02 NOTE — TELEPHONE ENCOUNTER
8/30/2020 Adderall ER 10 mg #60 NR    LOV:   7/2/2020 Dr Sosa Dias RTC 6 months  2. ADHD - Stable on prescription medication. No new issues.   No FOV scheduled

## 2021-01-12 NOTE — PROGRESS NOTES
Alejandra Naranjo is a 50year old female who presents for a complete physical exam.   HPI:   Pt presents for annual wellness screening. HTN - compliant with medication, no SEs. Home BP readings - 120s/80-90s.     ADHD - has been on Adderall \"forever\ History:  Social History    Tobacco Use      Smoking status: Never Smoker      Smokeless tobacco: Never Used    Alcohol use:  Yes      Alcohol/week: 0.0 standard drinks      Frequency: 2-4 times a month      Drinks per session: 1 or 2      Binge frequency: recommended labs & HM updated. Rec yearly skin exam, vision exam, and dental check ups q 6 months. # HTN: above goal.  Increase losartan to 100 mg. Pt to send me home BP readings in 2 weeks.   # ADHD: one month of Adderall refilled but pt to establish wi

## 2021-01-13 ENCOUNTER — OFFICE VISIT (OUTPATIENT)
Dept: INTERNAL MEDICINE CLINIC | Facility: CLINIC | Age: 46
End: 2021-01-13
Payer: COMMERCIAL

## 2021-01-13 VITALS
DIASTOLIC BLOOD PRESSURE: 96 MMHG | TEMPERATURE: 99 F | BODY MASS INDEX: 26.93 KG/M2 | HEIGHT: 63 IN | HEART RATE: 92 BPM | RESPIRATION RATE: 16 BRPM | SYSTOLIC BLOOD PRESSURE: 140 MMHG | OXYGEN SATURATION: 99 % | WEIGHT: 152 LBS

## 2021-01-13 DIAGNOSIS — Z00.00 ANNUAL PHYSICAL EXAM: Primary | ICD-10-CM

## 2021-01-13 DIAGNOSIS — F90.2 ATTENTION DEFICIT HYPERACTIVITY DISORDER (ADHD), COMBINED TYPE: ICD-10-CM

## 2021-01-13 DIAGNOSIS — J30.9 ALLERGIC RHINITIS, UNSPECIFIED SEASONALITY, UNSPECIFIED TRIGGER: ICD-10-CM

## 2021-01-13 DIAGNOSIS — I10 BENIGN ESSENTIAL HYPERTENSION: ICD-10-CM

## 2021-01-13 DIAGNOSIS — E55.9 VITAMIN D INSUFFICIENCY: ICD-10-CM

## 2021-01-13 DIAGNOSIS — Z12.11 COLON CANCER SCREENING: ICD-10-CM

## 2021-01-13 PROCEDURE — 3080F DIAST BP >= 90 MM HG: CPT | Performed by: PHYSICIAN ASSISTANT

## 2021-01-13 PROCEDURE — 3008F BODY MASS INDEX DOCD: CPT | Performed by: PHYSICIAN ASSISTANT

## 2021-01-13 PROCEDURE — 3077F SYST BP >= 140 MM HG: CPT | Performed by: PHYSICIAN ASSISTANT

## 2021-01-13 PROCEDURE — 99396 PREV VISIT EST AGE 40-64: CPT | Performed by: PHYSICIAN ASSISTANT

## 2021-01-13 RX ORDER — ASCORBIC ACID 500 MG
500 TABLET ORAL DAILY
COMMUNITY

## 2021-01-13 RX ORDER — MULTIVITAMIN WITH IRON
TABLET ORAL
COMMUNITY

## 2021-01-13 RX ORDER — BIOTIN 1 MG
1 TABLET ORAL DAILY
COMMUNITY

## 2021-01-13 RX ORDER — DEXTROAMPHETAMINE SACCHARATE, AMPHETAMINE ASPARTATE MONOHYDRATE, DEXTROAMPHETAMINE SULFATE AND AMPHETAMINE SULFATE 2.5; 2.5; 2.5; 2.5 MG/1; MG/1; MG/1; MG/1
10 CAPSULE, EXTENDED RELEASE ORAL 2 TIMES DAILY
Qty: 60 CAPSULE | Refills: 0 | Status: SHIPPED | OUTPATIENT
Start: 2021-01-13

## 2021-01-13 RX ORDER — ZINC SULFATE 50(220)MG
220 CAPSULE ORAL DAILY
COMMUNITY

## 2021-01-13 RX ORDER — LOSARTAN POTASSIUM 50 MG/1
100 TABLET ORAL DAILY
COMMUNITY
Start: 2021-01-13 | End: 2021-01-28

## 2021-01-13 NOTE — PATIENT INSTRUCTIONS
Blood pressure:  - increase losartan to 100 mg daily  - check BP at home and send [employer] a My [employer] message with your readings in 2 weeks    Follow up with psychiatry for further Adderall refills.     Please see GI (Dr. Radha Rodas or one of his partners) ASAP

## 2021-01-19 ENCOUNTER — LAB ENCOUNTER (OUTPATIENT)
Dept: LAB | Facility: HOSPITAL | Age: 46
End: 2021-01-19
Attending: PHYSICIAN ASSISTANT
Payer: COMMERCIAL

## 2021-01-19 DIAGNOSIS — Z00.00 ANNUAL PHYSICAL EXAM: ICD-10-CM

## 2021-01-19 DIAGNOSIS — E55.9 VITAMIN D INSUFFICIENCY: ICD-10-CM

## 2021-01-19 DIAGNOSIS — I10 BENIGN ESSENTIAL HYPERTENSION: ICD-10-CM

## 2021-01-19 LAB
ANION GAP SERPL CALC-SCNC: 6 MMOL/L (ref 0–18)
BUN BLD-MCNC: 14 MG/DL (ref 7–18)
BUN/CREAT SERPL: 13.3 (ref 10–20)
CALCIUM BLD-MCNC: 9.2 MG/DL (ref 8.5–10.1)
CHLORIDE SERPL-SCNC: 103 MMOL/L (ref 98–112)
CHOLEST SMN-MCNC: 186 MG/DL (ref ?–200)
CO2 SERPL-SCNC: 26 MMOL/L (ref 21–32)
CREAT BLD-MCNC: 1.05 MG/DL
GLUCOSE BLD-MCNC: 95 MG/DL (ref 70–99)
HCV AB SERPL QL IA: NONREACTIVE
HDLC SERPL-MCNC: 71 MG/DL (ref 40–59)
LDLC SERPL CALC-MCNC: 38 MG/DL (ref ?–100)
NONHDLC SERPL-MCNC: 115 MG/DL (ref ?–130)
OSMOLALITY SERPL CALC.SUM OF ELEC: 280 MOSM/KG (ref 275–295)
PATIENT FASTING Y/N/NP: YES
PATIENT FASTING Y/N/NP: YES
POTASSIUM SERPL-SCNC: 3.7 MMOL/L (ref 3.5–5.1)
SODIUM SERPL-SCNC: 135 MMOL/L (ref 136–145)
TRIGL SERPL-MCNC: 384 MG/DL (ref 30–149)
VIT D+METAB SERPL-MCNC: 26.7 NG/ML (ref 30–100)
VLDLC SERPL CALC-MCNC: 77 MG/DL (ref 0–30)

## 2021-01-19 PROCEDURE — 80061 LIPID PANEL: CPT

## 2021-01-19 PROCEDURE — 86803 HEPATITIS C AB TEST: CPT

## 2021-01-19 PROCEDURE — 82306 VITAMIN D 25 HYDROXY: CPT

## 2021-01-19 PROCEDURE — 36415 COLL VENOUS BLD VENIPUNCTURE: CPT

## 2021-01-19 PROCEDURE — 80048 BASIC METABOLIC PNL TOTAL CA: CPT

## 2021-01-27 ENCOUNTER — HOSPITAL ENCOUNTER (OUTPATIENT)
Dept: MAMMOGRAPHY | Age: 46
Discharge: HOME | End: 2021-01-27
Attending: OBSTETRICS & GYNECOLOGY
Payer: COMMERCIAL

## 2021-01-27 DIAGNOSIS — Z01.419 ENCOUNTER FOR GYNECOLOGICAL EXAMINATION WITHOUT ABNORMAL FINDING: ICD-10-CM

## 2021-01-27 PROCEDURE — 77063 BREAST TOMOSYNTHESIS BI: CPT | Performed by: OBSTETRICS & GYNECOLOGY

## 2021-01-27 PROCEDURE — 77067 SCR MAMMO BI INCL CAD: CPT | Performed by: OBSTETRICS & GYNECOLOGY

## 2021-06-09 NOTE — TELEPHONE ENCOUNTER
Passed protocol        Requesting losartan 100 MG Oral Tab  LOV: 1/13/21  RTC: 1 month for HTN  Last Relevant Labs: 1/19/21  Filled: 1/28/21 #90 with 1 refills    No future appointments.

## 2021-06-10 RX ORDER — LOSARTAN POTASSIUM 100 MG/1
TABLET ORAL
Qty: 90 TABLET | Refills: 0 | Status: SHIPPED | OUTPATIENT
Start: 2021-06-10

## (undated) NOTE — MR AVS SNAPSHOT
After Visit Summary   4/18/2017    Ana Maria Frederick    MRN: [id number]           Visit Information        Provider Department Dept Phone    4/18/2017  8:30 AM [facility], DO Tonia Bhatcurtis Hernandezh Noon [phone]      Your Vitals Were     BP Puls April 19, 2017        [address]       Dear Rafia Kelly : Thank you for enrolling in [address]. Please follow the instructions below to securely access your online medical record.  [employer] allows you to s conditions such as allergies, colds, cough, fever, rash, sore throat, headache and pink eye. The cost for a Video Visit is currently $10.         If you receive a survey from [employer], please take a few minutes to complete it and provide feedback.  We s

## (undated) NOTE — MR AVS SNAPSHOT
Hari Farmer  [address] [facility] [address]  [address] [place] [phone]               Thank you for choosing us for your health care visit with [facility], DO.   We are glad to serve you and happy to provide you with this Commonly known as:  WELLBUTRIN XL           CO Q 10 OR   Take  by mouth. Fiber 625 MG Tabs   Take  by mouth. Fish Oil 1200 MG Caps   Take  by mouth. FLUoxetine HCl (PMDD) 20 MG Tabs   Take 20 mg by mouth daily.  Take daily for DASH eating plan Adopt a diet rich in fruits, vegetables, and low fat dairy products with reduced content of saturated and total fat.    Dietary sodium reduction Reduce dietary sodium intake to <= 100 mmol per day (2.4 g sodium or 6 g sodium chloride)   Aer You don’t need to join a gym. Home exercises work great.  Put more priority on exercise in your life                    Visit [facility] online at  [email or web address]